# Patient Record
Sex: FEMALE | Race: BLACK OR AFRICAN AMERICAN | NOT HISPANIC OR LATINO
[De-identification: names, ages, dates, MRNs, and addresses within clinical notes are randomized per-mention and may not be internally consistent; named-entity substitution may affect disease eponyms.]

---

## 2022-06-28 ENCOUNTER — APPOINTMENT (OUTPATIENT)
Dept: OBGYN | Facility: CLINIC | Age: 32
End: 2022-06-28

## 2022-12-08 ENCOUNTER — APPOINTMENT (OUTPATIENT)
Dept: OBGYN | Facility: CLINIC | Age: 32
End: 2022-12-08

## 2022-12-08 PROCEDURE — 36415 COLL VENOUS BLD VENIPUNCTURE: CPT

## 2022-12-08 PROCEDURE — 99385 PREV VISIT NEW AGE 18-39: CPT

## 2022-12-08 PROCEDURE — 99213 OFFICE O/P EST LOW 20 MIN: CPT | Mod: 25

## 2022-12-08 RX ORDER — CHLORHEXIDINE GLUCONATE 213 G/1000ML
4 SOLUTION TOPICAL
Qty: 1 | Refills: 2 | Status: ACTIVE | COMMUNITY
Start: 2022-12-08 | End: 1900-01-01

## 2022-12-08 NOTE — DISCUSSION/SUMMARY
[FreeTextEntry1] : 31 y/o P2 presenting for annual exam\par -f/u pap and GC/CT\par -f/u STI bloodwork\par -Hot flushes and irregular menses: f/u hormonal panel\par -folliculitis: vulvar hygiene reviewed, avoid shaving, Rx sent for hibiclens wash\par -Contraception: none\par -f/u 2 weeks for GYN sono and results review

## 2022-12-08 NOTE — PHYSICAL EXAM
[Appropriately responsive] : appropriately responsive [Alert] : alert [No Acute Distress] : no acute distress [Soft] : soft [Non-tender] : non-tender [Non-distended] : non-distended [No HSM] : No HSM [No Lesions] : no lesions [No Mass] : no mass [Oriented x3] : oriented x3 [Breast Palpation Diffuse Fibrous Tissue Bilateral] : fibrocystic changes [No Masses] : no breast masses were palpable [Labia Majora] : normal [Labia Minora] : normal [Normal] : normal [Uterine Adnexae] : normal [FreeTextEntry6] : right sided pelvic fullness

## 2022-12-08 NOTE — HISTORY OF PRESENT ILLNESS
[FreeTextEntry1] : Patient is a 33 y/o P3 presenting for an annual visit. LMP 10/7/22 - menses has always been irregular but can be heavy with clots. She is currently sexually active with her  but uses condoms occasionally. Patient also c/o hot flushes and recurrent vulvar folliculitis.. She denies vaginal itching, odor and discharge. Denies urinary urgency, frequency and dysuria. \par \par Has h/o right breast pain but had sonogram which was negative- denies caffeine intake and use of underwire bra.\par \par OBHx:\par :  40 weeks, 8#10\par   40 weeks 9#8\par   41 weeks 9#8

## 2022-12-12 LAB
C TRACH RRNA SPEC QL NAA+PROBE: NOT DETECTED
HPV HIGH+LOW RISK DNA PNL CVX: NOT DETECTED
N GONORRHOEA RRNA SPEC QL NAA+PROBE: NOT DETECTED
SOURCE AMPLIFICATION: NORMAL

## 2022-12-20 LAB
CYTOLOGY CVX/VAG DOC THIN PREP: ABNORMAL
HCG SERPL-MCNC: 1 MIU/ML
PROLACTIN SERPL-MCNC: 6.8 NG/ML
T4 FREE SERPL-MCNC: 1 NG/DL
TSH SERPL-ACNC: 2.23 UIU/ML

## 2022-12-23 ENCOUNTER — APPOINTMENT (OUTPATIENT)
Dept: OBGYN | Facility: CLINIC | Age: 32
End: 2022-12-23

## 2022-12-23 ENCOUNTER — TRANSCRIPTION ENCOUNTER (OUTPATIENT)
Age: 32
End: 2022-12-23

## 2022-12-23 ENCOUNTER — APPOINTMENT (OUTPATIENT)
Dept: ANTEPARTUM | Facility: CLINIC | Age: 32
End: 2022-12-23
Payer: COMMERCIAL

## 2022-12-23 VITALS — SYSTOLIC BLOOD PRESSURE: 106 MMHG | DIASTOLIC BLOOD PRESSURE: 71 MMHG | WEIGHT: 187 LBS

## 2022-12-23 DIAGNOSIS — R23.2 FLUSHING: ICD-10-CM

## 2022-12-23 DIAGNOSIS — N92.6 IRREGULAR MENSTRUATION, UNSPECIFIED: ICD-10-CM

## 2022-12-23 PROCEDURE — 99214 OFFICE O/P EST MOD 30 MIN: CPT

## 2022-12-23 PROCEDURE — 76857 US EXAM PELVIC LIMITED: CPT

## 2022-12-23 PROCEDURE — 76830 TRANSVAGINAL US NON-OB: CPT

## 2022-12-23 NOTE — HISTORY OF PRESENT ILLNESS
[FreeTextEntry1] : 33 y/o P3 presenting for follow up. Patient has h/o irregular menses and hot flushes. Patient reports mother went through menopause at age 35. \par \par Hormonal panel reviewed:\par estrogen and progesterone low\par FSH high [PapSmeardate] : 12/8/22 [TextBox_31] : NILM, HPV neg

## 2022-12-23 NOTE — DISCUSSION/SUMMARY
[FreeTextEntry1] : 33 y/o F with irregular menses and hot flushes\par -sono today wnl (please see report)\par -discussed likely diagnosis of premature ovarian failure: will need a repeat blood draw in 1 month for confirmation\par -Discussed need for estrogen (2mg day patch) + micronized progesterone 100mg/day for bone and heart health\par -Advised that in the interim, patient and partner should discuss family planning goals as patient may still be ovulatory at present\par -f/u 1 month for rpt blood draw

## 2022-12-28 LAB
ESTRADIOL SERPL-MCNC: <5 PG/ML
FSH SERPL-MCNC: 64.6 IU/L
LH SERPL-ACNC: 39.7 IU/L
PROGEST SERPL-MCNC: 0.1 NG/ML

## 2023-01-03 ENCOUNTER — TRANSCRIPTION ENCOUNTER (OUTPATIENT)
Age: 33
End: 2023-01-03

## 2023-01-05 ENCOUNTER — APPOINTMENT (OUTPATIENT)
Dept: OBGYN | Facility: CLINIC | Age: 33
End: 2023-01-05
Payer: COMMERCIAL

## 2023-01-05 VITALS — SYSTOLIC BLOOD PRESSURE: 106 MMHG | DIASTOLIC BLOOD PRESSURE: 77 MMHG | WEIGHT: 188.5 LBS

## 2023-01-05 DIAGNOSIS — L73.9 FOLLICULAR DISORDER, UNSPECIFIED: ICD-10-CM

## 2023-01-05 PROCEDURE — 99213 OFFICE O/P EST LOW 20 MIN: CPT

## 2023-01-05 RX ORDER — CLINDAMYCIN PHOSPHATE 1 G/10ML
1 GEL TOPICAL DAILY
Qty: 1 | Refills: 0 | Status: ACTIVE | COMMUNITY
Start: 2023-01-05 | End: 1900-01-01

## 2023-01-05 NOTE — PHYSICAL EXAM
[Appropriately responsive] : appropriately responsive [Alert] : alert [No Acute Distress] : no acute distress [Soft] : soft [Non-tender] : non-tender [Non-distended] : non-distended [No HSM] : No HSM [No Lesions] : no lesions [No Mass] : no mass [Oriented x3] : oriented x3 [FreeTextEntry1] : left vulva with ingrown hair draining slight serosanguineous fluid

## 2023-01-05 NOTE — DISCUSSION/SUMMARY
[FreeTextEntry1] : 31 y/o F with vulvar folliculitis\par -advised to f/u with pharmacy about why Rx for hibliclens wash not filled as this is preventive\par -Rx clindamycin gel for current ingrown hair\par -vulvovaginal hygeine\par -f/u scheduled for confirmation labs for POF on 1/20

## 2023-01-05 NOTE — HISTORY OF PRESENT ILLNESS
[FreeTextEntry1] : Patient presenting with left vulvar pain. Known folliculitis - stopped shaving. Ingrown hair now draining fluid, less painful . Wasn't able to  hibiclens was from pharmacy.  [PapSmeardate] : 12/8/2022 [TextBox_31] : NILM, HPV neg

## 2023-01-20 ENCOUNTER — APPOINTMENT (OUTPATIENT)
Dept: OBGYN | Facility: CLINIC | Age: 33
End: 2023-01-20
Payer: COMMERCIAL

## 2023-01-20 DIAGNOSIS — Z00.00 ENCOUNTER FOR GENERAL ADULT MEDICAL EXAMINATION W/OUT ABNORMAL FINDINGS: ICD-10-CM

## 2023-01-20 PROCEDURE — 36415 COLL VENOUS BLD VENIPUNCTURE: CPT

## 2023-02-10 LAB
ANTI-MUELLERIAN HORMONE: <0.015 NG/ML
ESTRADIOL SERPL-MCNC: <5 PG/ML
FSH SERPL-MCNC: 49.8 IU/L
LH SERPL-ACNC: 26.3 IU/L
PROGEST SERPL-MCNC: 0.1 NG/ML

## 2023-02-16 ENCOUNTER — APPOINTMENT (OUTPATIENT)
Dept: OBGYN | Facility: CLINIC | Age: 33
End: 2023-02-16

## 2024-07-23 ENCOUNTER — APPOINTMENT (OUTPATIENT)
Dept: OBGYN | Facility: CLINIC | Age: 34
End: 2024-07-23

## 2024-08-02 ENCOUNTER — APPOINTMENT (OUTPATIENT)
Dept: OBGYN | Facility: CLINIC | Age: 34
End: 2024-08-02

## 2024-08-02 VITALS
BODY MASS INDEX: 33.15 KG/M2 | HEIGHT: 65 IN | DIASTOLIC BLOOD PRESSURE: 85 MMHG | WEIGHT: 199 LBS | SYSTOLIC BLOOD PRESSURE: 121 MMHG

## 2024-08-02 DIAGNOSIS — N92.6 IRREGULAR MENSTRUATION, UNSPECIFIED: ICD-10-CM

## 2024-08-02 DIAGNOSIS — Z01.419 ENCOUNTER FOR GYNECOLOGICAL EXAMINATION (GENERAL) (ROUTINE) W/OUT ABNORMAL FINDINGS: ICD-10-CM

## 2024-08-02 DIAGNOSIS — R23.2 FLUSHING: ICD-10-CM

## 2024-08-02 PROCEDURE — 99395 PREV VISIT EST AGE 18-39: CPT

## 2024-08-02 PROCEDURE — 36415 COLL VENOUS BLD VENIPUNCTURE: CPT

## 2024-08-04 NOTE — DISCUSSION/SUMMARY
[FreeTextEntry1] : 34 year old P3 presenting for annual exam -f/u pap and GC/CT -requesting STI testing - was told by PCP that she has HSV - would like confirmation - denies any outbreak -Hot flushes and oligomenorrhea - likely POF given prior lab testing - will send confirmatory labs today -Contraception: none -f/u for GYN sono and results review

## 2024-08-04 NOTE — HISTORY OF PRESENT ILLNESS
[FreeTextEntry1] : Patient is a 34 year old P3 presenting for an annual visit. LMP 24 but this is only her second menstrual cycle for the year. She was diagnosed with POF in the past and still reports having hot flushes. She denies vaginal itching, odor and discharge. Denies urinary urgency, frequency and dysuria. She is currently sexually active with one male partner.  OBHx: 2009:  40 weeks, 8#10   40 weeks 9#8   41 weeks 9#8

## 2024-08-05 LAB
ALBUMIN SERPL ELPH-MCNC: 4.8 G/DL
ALP BLD-CCNC: 99 U/L
ALT SERPL-CCNC: 16 U/L
ANION GAP SERPL CALC-SCNC: 15 MMOL/L
AST SERPL-CCNC: 15 U/L
BILIRUB SERPL-MCNC: 0.3 MG/DL
BUN SERPL-MCNC: 13 MG/DL
C TRACH RRNA SPEC QL NAA+PROBE: NOT DETECTED
CALCIUM SERPL-MCNC: 9.9 MG/DL
CHLORIDE SERPL-SCNC: 104 MMOL/L
CO2 SERPL-SCNC: 24 MMOL/L
CREAT SERPL-MCNC: 0.74 MG/DL
DHEA-S SERPL-MCNC: 215 UG/DL
EGFR: 109 ML/MIN/1.73M2
ESTIMATED AVERAGE GLUCOSE: 111 MG/DL
ESTRADIOL SERPL-MCNC: 17 PG/ML
GLUCOSE SERPL-MCNC: 76 MG/DL
HAV IGM SER QL: NONREACTIVE
HBA1C MFR BLD HPLC: 5.5 %
HBV CORE IGM SER QL: NONREACTIVE
HBV SURFACE AG SER QL: NONREACTIVE
HCV AB SER QL: NONREACTIVE
HCV S/CO RATIO: 0.23 S/CO
HIV1+2 AB SPEC QL IA.RAPID: NONREACTIVE
HPV HIGH+LOW RISK DNA PNL CVX: NOT DETECTED
N GONORRHOEA RRNA SPEC QL NAA+PROBE: NOT DETECTED
POTASSIUM SERPL-SCNC: 4.2 MMOL/L
PROGEST SERPL-MCNC: <0.1 NG/ML
PROT SERPL-MCNC: 7.7 G/DL
SODIUM SERPL-SCNC: 142 MMOL/L
SOURCE AMPLIFICATION: NORMAL

## 2024-08-06 LAB
25(OH)D3 SERPL-MCNC: 20 NG/ML
FSH SERPL-MCNC: 39.8 IU/L
LH SERPL-ACNC: 28.8 IU/L
PROLACTIN SERPL-MCNC: 10.6 NG/ML
T4 FREE SERPL-MCNC: 0.8 NG/DL
TESTOST FREE SERPL-MCNC: 2.6 PG/ML
TESTOST SERPL-MCNC: 25.1 NG/DL
TSH SERPL-ACNC: 3.27 UIU/ML

## 2024-08-08 LAB
HSV 1+2 IGG SER IA-IMP: NEGATIVE
HSV 1+2 IGG SER IA-IMP: POSITIVE
HSV1 IGG SER QL: 0.72 INDEX
HSV2 IGG SER QL: 10 INDEX
T PALLIDUM AB SER QL IA: NEGATIVE

## 2024-08-09 LAB — CYTOLOGY CVX/VAG DOC THIN PREP: ABNORMAL

## 2024-09-06 ENCOUNTER — APPOINTMENT (OUTPATIENT)
Dept: ANTEPARTUM | Facility: CLINIC | Age: 34
End: 2024-09-06

## 2024-09-06 ENCOUNTER — APPOINTMENT (OUTPATIENT)
Dept: OBGYN | Facility: CLINIC | Age: 34
End: 2024-09-06
Payer: COMMERCIAL

## 2024-09-06 VITALS
WEIGHT: 196 LBS | DIASTOLIC BLOOD PRESSURE: 77 MMHG | HEIGHT: 65 IN | SYSTOLIC BLOOD PRESSURE: 112 MMHG | BODY MASS INDEX: 32.65 KG/M2

## 2024-09-06 DIAGNOSIS — N91.5 OLIGOMENORRHEA, UNSPECIFIED: ICD-10-CM

## 2024-09-06 DIAGNOSIS — R23.2 FLUSHING: ICD-10-CM

## 2024-09-06 PROCEDURE — 99213 OFFICE O/P EST LOW 20 MIN: CPT

## 2024-09-06 PROCEDURE — 76857 US EXAM PELVIC LIMITED: CPT

## 2024-09-06 PROCEDURE — 76830 TRANSVAGINAL US NON-OB: CPT

## 2024-09-06 NOTE — PROCEDURE
[Transvaginal Ultrasound] : transvaginal ultrasound [FreeTextEntry3] : Normal ovaries, Right ovarian follicle. Uterus 9x5x5 possible adenomyosis, small myomas

## 2024-09-06 NOTE — HISTORY OF PRESENT ILLNESS
[FreeTextEntry1] : 35 y/o P3 presenting for follow up. Patient with suspected POF. Reports LMP 7/2024 (heavy) - but states hot flushes have decreased in frequency and intensity lately.  FSH Trend: 64>49>39

## 2024-09-06 NOTE — DISCUSSION/SUMMARY
[FreeTextEntry1] : 33 y/o F presenting for follow up -Patient with suspected PFO as FSH elevated and patient symptomatic - however FSH slowly down trending and symptoms improving -patient opting to forego HRT at this time will start chasteberry supplement -repeat labs in 3 months and f/u via tele to review symptoms

## 2024-12-04 ENCOUNTER — TRANSCRIPTION ENCOUNTER (OUTPATIENT)
Age: 34
End: 2024-12-04

## 2024-12-04 DIAGNOSIS — N91.5 OLIGOMENORRHEA, UNSPECIFIED: ICD-10-CM

## 2024-12-17 ENCOUNTER — APPOINTMENT (OUTPATIENT)
Dept: OBGYN | Facility: CLINIC | Age: 34
End: 2024-12-17